# Patient Record
Sex: MALE | Race: BLACK OR AFRICAN AMERICAN | NOT HISPANIC OR LATINO | URBAN - METROPOLITAN AREA
[De-identification: names, ages, dates, MRNs, and addresses within clinical notes are randomized per-mention and may not be internally consistent; named-entity substitution may affect disease eponyms.]

---
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
## 2021-03-29 PROBLEM — M25.511 RIGHT SHOULDER PAIN: Status: ACTIVE | Noted: 2021-03-29

## 2021-03-30 ENCOUNTER — APPOINTMENT (OUTPATIENT)
Dept: ORTHOPEDIC SURGERY | Facility: CLINIC | Age: 21
End: 2021-03-30

## 2021-03-30 DIAGNOSIS — M25.511 PAIN IN RIGHT SHOULDER: ICD-10-CM

## 2025-06-09 NOTE — ED PEDIATRIC NURSE NOTE - ASSOCIATED SYMPTOMS
-Continue Anoro 1 puff once daily.  -May use albuterol as frequently as 2 puffs every 4-6 hours as needed, for increased symptoms of shortness of breath, chest tightness, cough or wheezing.  You may also benefit from pre-treatment with albuterol prior to exercise, 2 puffs 10-15 minutes prior to exertion.    Return to clinic in 1 year for follow up.    
sore throat